# Patient Record
Sex: MALE | Race: WHITE
[De-identification: names, ages, dates, MRNs, and addresses within clinical notes are randomized per-mention and may not be internally consistent; named-entity substitution may affect disease eponyms.]

---

## 2021-01-11 ENCOUNTER — HOSPITAL ENCOUNTER (OUTPATIENT)
Dept: HOSPITAL 46 - DS | Age: 41
Discharge: HOME | End: 2021-01-11
Attending: SPECIALIST
Payer: COMMERCIAL

## 2021-01-11 VITALS — BODY MASS INDEX: 35.29 KG/M2 | HEIGHT: 72 IN | WEIGHT: 260.54 LBS

## 2021-01-11 DIAGNOSIS — G47.33: ICD-10-CM

## 2021-01-11 DIAGNOSIS — I10: ICD-10-CM

## 2021-01-11 DIAGNOSIS — G89.18: ICD-10-CM

## 2021-01-11 DIAGNOSIS — M19.111: Primary | ICD-10-CM

## 2021-01-11 DIAGNOSIS — Z79.01: ICD-10-CM

## 2021-01-11 PROCEDURE — C1776 JOINT DEVICE (IMPLANTABLE): HCPCS

## 2021-01-11 PROCEDURE — L3670 SO ACRO/CLAV CAN WEB PRE OTS: HCPCS

## 2021-01-11 NOTE — NUR
[id number]: IN TO PT ROOM, PT DENIES NAUSEA AT THIS TIME. WILL HOLD NEW ORDER IN
CASE PT BECOMES NAUSEATED AGAIN. PT CONT TO WORK WITH PHYSICAL THERAPY.
1530: ILENE WITH PHYSICAL THERAPY STATES PT IS SAFE TO DC HOME. PT UP TO
BATHROOM WITH RN ASSIST, STEADY GAIT DENIES NAUSEA OR DIZZINESS. PT ABLE TO
VOID QS ORANGE COLORED URINE WITH NO PROBLEMS. BACK TO ROOM TO REST.
1600: DR. NIEVES NOTIFIED OF PT STATUS, GIVES VERBAL DC ORDER.
1630: PT MOTHER ARRIVES TO DS RM 4. DC INSTRUCTIONS PRESENTED TO BOTH PT AND
MOTHER, MANY QUESTIONS ASKED AND ADDRESSED. PT AWARE OF MEDICATION ESCRIPTED
TO PREFERRED PHARMACY. THIS RN ASSISTS DRESSING PT, EDUCATION GIVEN REGARDING
SLING AND CRYO CUFF. MOTHER PULLS PERSONAL VEHICLE IN FRONT OF MAIN HOSPITAL
DOORS. PT DC FROM DS RM 4 VIA WC TO HOME.

## 2021-01-11 NOTE — NUR
PATIENT CALLED REPORTING NAUSEA WHILE WORKING WITH PHYSICAL THERAPY. PER MAR
NO AVAILABLE ORDERS TO ADMINISTER ANTIEMETICS. NOTIFIED NIEVES BURRIS, WHO
VERBALIZED INAPSINE 1.25 MG IV ONCE. REPORTED TO DANIEL GUO, PRIMARY NURSE.

## 2021-01-11 NOTE — NUR
PT RESTING WITH EYES CLOSED ON ENTRANCE TO ROOM. PT EASILY AROUSES WITH VERBAL
STIMULI. PT DENIES ANY PAIN IN RIGHT SHOULDER. PT TOLERATES WATER AND JELLO
WITH NO NAUSEA. CONT PULSE OXIMETER IN PLACE, SATS GREATER THAN 94% ON RA.
CALL LIGHT WITHIN REACH.

## 2021-01-11 NOTE — NUR
PT ARRIVES TO PACU AWAKE AND ALERT, RR EVEN AND UNLABORED. PT DENIES PAIN IN
RIGHT SHOULDER STATES "COMPLETELY NUMB" AND IS UNABLE TO MOVE DIGITS ON RIGHT
SIDE. PT PROVIDED ICED WATER, JELLO AND CRACKERS. PT REQUESTS TO LAY BACK AND
LIGHTS DIMMED. CALL LIGHT WITHIN REACH.

## 2021-01-11 NOTE — NUR
PT WAKES WHEN THIS RN ENTERS ROOM. PT POSITIONED TO 90 DEGREES IN BED. PT
DENIES ANY PAIN OR NAUSEA. NERVE BLOCK IN PLACE, PT UNABLE TO OPEN HAND OR
MOVE THUMB AT THIS TIME. PT STATES RIGHT ARM NUMB. ILENE WITH PT IN PT ROOM
WITH STUDENT. PT REQUESTS "FRUIT [facility]MOOTHIE" THIS RN WILL NOTIFY DIETARY. CALL
LIGHT WITHIN REACH.

## 2021-01-11 NOTE — OR
[facility]
                                    [address], [place]  [address]
_________________________________________________________________________________________
                                                                 Signed   
 
 
DATE OF OPERATION:
01/11/2021
 
SURGEON:
Ran Araujo MD
 
PREOPERATIVE DIAGNOSIS:
Posttraumatic degenerative joint disease, right shoulder.
 
POSTOPERATIVE DIAGNOSIS:
Posttraumatic degenerative joint disease, right shoulder.
 
PROCEDURE PERFORMED:
Right shoulder hemiarthroplasty.
 
ASSISTANT:
Tisha Frederick PA-C.
 
ANESTHESIA:
General.
 
BLOOD LOSS:
200 mL.
 
IMPLANTS:
[place] size 12 reunion with a 52 x 17 head.
 
BRIEF HISTORY:
Lex is a 40-year-old gentleman who had increasing shoulder pain and stiffness.  He
had multiple dislocations as a youth and was fixed about 15 years ago.  The shoulder
continued to progress __________ fashion.  Risks and benefits of operative treatment
were discussed with him and he elected to proceed. 
 
DESCRIPTION OF PROCEDURE:
Once consent was obtained, he was taken to the operating room.  After adequate
anesthesia, he was placed in a low beach chair position.  All downside pressure points
were well padded.  The opposite arm was placed in well-padded arm conner.  The arm was
then prepped and draped in a standard sterile fashion from the angle of the jaw to the
hand.  The standard deltopectoral approach was taken through skin and subcutaneous
tissue.  The cephalic vein was located and was dissected free and taken medially.  All
bleeders were cauterized as we went.  The deltoid was then mobilized with the arm in
abduction.  The conjoined tendon was located and mobilized to protect the nerve.  The
 
    Electronically Signed By: RAN ARAUJO MD  01/11/21 1502
_________________________________________________________________________________________
PATIENT NAME:     LEX AGUILAR                 
MEDICAL RECORD #: [id number]            OPERATIVE REPORT              
          ACCT #: [id number]  
DATE OF BIRTH:   [birth year]            REPORT #: [id number]      
PHYSICIAN:        RAN ARAUJO MD              
PCP:              MARCELINO ZAMAN              
REPORT IS CONFIDENTIAL AND NOT TO BE RELEASED WITHOUT AUTHORIZATION
 
 
                                  [facility]
                                    [address], [place]  [address]
_________________________________________________________________________________________
                                                                 Signed   
 
 
inferior vessels were then cauterized and the subscapularis was identified.  The biceps
tendon was identified and the biceps tendon sheath was opened up.  The biceps was then
tenodesed at the pectoralis insertion.  The upper 1/3rd of the pectoralis was released
to help with exposure.  The biceps was then tenotomized and the biceps tendon groove was
opened all the way into the rotator cuff interval.  The lesser tuberosity was then
osteotomized using curved osteotome and the capsule was peeled off the anterior humerus.
 This mobilized the subscapularis.  Adhesions deep and superficial surfaces were bluntly
dissected.  There was good excursion of the tendon, #2 FiberWire was placed in this to
provide retraction.  Once this was opened up, the labrum and remainder of the biceps was
removed.  The shoulder was then dislocated.  The center of the shoulder of the humeral
shaft was then opened up with a rongeur superiorly.  The sharp awl was then used to
enter the canal and it was sequentially reamed up to a 13.  The cutting jig was then
placed on the reamer and advanced until it was in the proper position such that the cut
would come out right at the insertion of the rotator cuff.  The cut was made with care
taken to protect the surrounding soft tissues.  The cut was finished using the
osteotomes and the piece was passed off to be measured.  The periarticular osteophytes
were removed and the humerus was broached starting with a 9 going up to a 12.  We left
the 12 in position and trialed a 52 x 20 head, which was too big.  There was not enough
posterior excursion.  We went down to the next level which was a 52 x 17, and we had
good 50% posterior subluxation and good range of motion in abduction and internal
rotation.  The shoulder was again dislocated and the trials were removed.  Three drill
holes were placed in the lesser tuberosity and #5 Ethibond was placed through these.
The stem was then impacted until it was almost completely seated and then, the head was
placed on it and the final __________ well seated.  The shoulder was again reduced taken
through range of motion as noted above.  All bleeders were cauterized throughout the
procedure.  The shoulder was then irrigated using normal saline followed by dilute
iodine, followed by more normal saline.  The periarticular soft tissues were injected
with 60 mL of Toradol-ropivacaine mixture.  The lesser tuberosity was then repaired back
to its bony bed using #5 FiberWire previously placed using a racking hitch stitch.  The
three stitches were then tied to each other.  The rotator cuff interval was partially
closed using #2 FiberWire and both repairs were over sewed with #2 FiberWire.  There was
excellent adherence at the end.  The wound was again irrigated and deltopectoral
interval was closed using 0 Vicryl.  The vein was noted to be torn at the end of the
procedure and was tied off with 3-0 silk.  The subcutaneous tissue was closed with 2-0
Stratafix, and the skin with staples.  The wound was dressed with a ANNE-MARIE wound VAC
dressing.  He was awakened and taken to the recovery room in satisfactory condition.
All sponge, needle, and instrument counts were correct. 
 
 
 
            ________________________________________
            Ran Araujo MD 
 
    Electronically Signed By: RAN ARAUJO MD  01/11/21 1502
_________________________________________________________________________________________
PATIENT NAME:     LEX AGUILAR                 
MEDICAL RECORD #: [id number]            OPERATIVE REPORT              
          ACCT #: [id number]  
DATE OF BIRTH:   [birth year]            REPORT #: [id number]      
PHYSICIAN:        RAN ARAUJO MD              
PCP:              MARCELINO ZAMAN              
REPORT IS CONFIDENTIAL AND NOT TO BE RELEASED WITHOUT AUTHORIZATION
 
 
                                  [facility]
                                    [address], [place]  [address]
_________________________________________________________________________________________
                                                                 Signed   
 
 
 
 
/[facility]
Job #:  [id number]
DD:  01/11/2021 11:44:48
DT:  01/11/2021 12:08:13
 
 
Copies:                                
~
 
 
 
 
 
 
 
 
 
 
 
 
 
 
 
 
 
 
 
 
 
 
 
 
 
 
 
 
 
 
 
 
 
    Electronically Signed By: RAN ARAUJO MD  01/11/21 1502
_________________________________________________________________________________________
PATIENT NAME:     LEX AGIULAR                 
MEDICAL RECORD #: [id number]            OPERATIVE REPORT              
          ACCT #: [id number]  
DATE OF BIRTH:   [birth year]            REPORT #: [id number]      
PHYSICIAN:        RAN ARAUJO MD              
PCP:              MARCELINO ZAMAN              
REPORT IS CONFIDENTIAL AND NOT TO BE RELEASED WITHOUT AUTHORIZATION

## 2021-01-11 NOTE — NUR
BILATERAL ADOLFO HOSE IN PLACE WITH FOOTPUMPS. PT BRUSHED TEETH WITH PROVIDED
RINSE AND NOSE SWABBED. MOTHER IN ROOM AT BEDSIDE, CALL LIGHT WITHIN REACH.

## 2021-01-11 NOTE — NUR
01/11/21 1159 Sheets,Darcie
1145 PT ARRIVED TO PACU SNORING AND JAW THRUST NEEDED OFF AND ON TO
MAINTAIN AIRWAY. PT ON 6L VIA MASK, VSS.
 
1152 PT MAINTAIING OWN AIRWAY AND SNORING NOTED. PT REACTIVE TO
PAINFUL STIMULI.
 
1156 PT WAKES AND IS REORIENTED TO PACU, PT DENIES PAIN AND NAUSEA.
 
1157 O2 MASK REMOVED AND PT ENCOURAGED TO DEEP BREATH.

## 2022-10-14 ENCOUNTER — HOSPITAL ENCOUNTER (EMERGENCY)
Dept: HOSPITAL 46 - ED | Age: 42
Discharge: HOME | End: 2022-10-14
Payer: COMMERCIAL

## 2022-10-14 VITALS — BODY MASS INDEX: 35.21 KG/M2 | HEIGHT: 72 IN | WEIGHT: 259.99 LBS

## 2022-10-14 DIAGNOSIS — Z79.899: ICD-10-CM
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
## 2022-10-14 DIAGNOSIS — I10: ICD-10-CM

## 2022-10-14 DIAGNOSIS — Z23: ICD-10-CM

## 2022-10-14 DIAGNOSIS — S80.211A: ICD-10-CM

## 2022-10-14 DIAGNOSIS — S71.112A: Primary | ICD-10-CM

## 2022-10-14 PROCEDURE — A9270 NON-COVERED ITEM OR SERVICE: HCPCS

## 2022-10-14 PROCEDURE — 0HQJXZZ REPAIR LEFT UPPER LEG SKIN, EXTERNAL APPROACH: ICD-10-PCS | Performed by: FAMILY MEDICINE

## 2022-10-14 NOTE — XMS
PreManage Notification: YULI AGUILAR MRN:[id number]
 
Security Information
 
Security Events
No recent Security Events currently on file
 
 
 
[facility]
------------
[facility]
 
 
CARE PROVIDERS
There are no care providers on record at this time.
 
[facility] has no Care Guidelines for this patient.
 
[facility] VISIT COUNT (12 MO.)
-------------------------------------------------------------------------------------
1 DOMITILA Arroyo
-------------------------------------------------------------------------------------
TOTAL 1
-------------------------------------------------------------------------------------
NOTE: Visits indicate total known visits.
 
ED/UCC VISIT TRACKING (12 MO.)
-------------------------------------------------------------------------------------
10/14/2022 20:10
DOMITILA [place]
 
TYPE: Emergency
 
COMPLAINT:
- R AND L LEG INJ
-------------------------------------------------------------------------------------
 
 
INPATIENT VISIT TRACKING (12 MO.)
No inpatient visits to display in this time frame
 
[email or web address]

## 2025-02-27 ENCOUNTER — HOSPITAL ENCOUNTER (OUTPATIENT)
Dept: HOSPITAL 46 - DS | Age: 45
Discharge: HOME | End: 2025-02-27
Attending: SURGERY
Payer: COMMERCIAL

## 2025-02-27 VITALS — DIASTOLIC BLOOD PRESSURE: 88 MMHG | SYSTOLIC BLOOD PRESSURE: 132 MMHG

## 2025-02-27 VITALS — WEIGHT: 255.52 LBS | BODY MASS INDEX: 34.61 KG/M2 | HEIGHT: 72 IN

## 2025-02-27 VITALS — DIASTOLIC BLOOD PRESSURE: 94 MMHG | SYSTOLIC BLOOD PRESSURE: 146 MMHG

## 2025-02-27 DIAGNOSIS — I10: ICD-10-CM

## 2025-02-27 DIAGNOSIS — G47.30: ICD-10-CM

## 2025-02-27 DIAGNOSIS — Z12.11: Primary | ICD-10-CM

## 2025-02-27 DIAGNOSIS — Z80.0: ICD-10-CM

## 2025-02-27 DIAGNOSIS — Z79.899: ICD-10-CM

## 2025-02-27 DIAGNOSIS — E66.9: ICD-10-CM

## 2025-02-27 PROCEDURE — G0500 MOD SEDAT ENDO SERVICE >5YRS: HCPCS

## 2025-02-27 PROCEDURE — 0DJD8ZZ INSPECTION OF LOWER INTESTINAL TRACT, VIA NATURAL OR ARTIFICIAL OPENING ENDOSCOPIC: ICD-10-PCS | Performed by: SURGERY

## 2025-02-27 NOTE — NUR
02/27/25 0820 Lisa Zacarias
0805- PT PRESENTS TO PACU, SEMI NEFF POSITION. AWAKE BUT VERY
DROWSY. REPORTS PAIN 1/10 TO ABD, FIRM, ENCOURAGED TO PASS GAS. DENIES
NAUSEA. LR INFUSING TO RW IV. O2 AT 2L PER NC ON ARRIVAL, TURNED TO
ROOM AIR. BREATHING EVEN AND NON LABORED. ALL MONITORS IN PLACE.
 
0815- PT RESTING INTERMITTENTLY, CONTINUE TO MONITOR, NO SIGNS OF
DISTRESS.
 
0820- PT REPORTS NO CHANGE IN ABD PAIN,  EDUCATED ON LAYING ON SIDE TO
HELP PASS GAS. PT CHOOSES TO LAY ON BACK, AWAKE OFF AND ON.

## 2025-03-01 NOTE — OR
[facility]
                                    [address], [place]  [address]
_________________________________________________________________________________________
                                                                 Signed   
 
 
DATE OF OPERATION:
02/27/2025
 
SURGEON:
Junior Beltran MD
 
PREOPERATIVE DIAGNOSIS:
Family history of colon cancer (mother).
 
POSTOPERATIVE DIAGNOSIS:
Normal colon.
 
PROCEDURE:
Total colonoscopy to cecum.
 
ANESTHESIA:
Intravenous sedation; fentanyl 150 mcg and Versed 6 mg.
 
INDICATION:
This 44-year-old white man is a patient of BRANDY Martinez.  He is known to have a
family history of colon cancer in his mother on whom I operated.  The patient currently
has no symptoms of bleeding, diarrhea, or constipation.  He is admitted at this time to
undergo screening colonoscopy on the basis of his family history and his age of 44
years.  He understands the risk of bleeding, infection, and perforation and wished to
proceed. 
 
FINDINGS:
The prep was excellent.  Complete colonoscopy was undertaken to the cecum with full
intubation of the cecum.  There was no sign of polyp, diverticular formation, colitis,
or cancer.  Retroflexed view was normal as well. 
 
DESCRIPTION OF PROCEDURE:
The patient was brought to the endoscopy suite and placed in the lateral decubitus
position, given intravenous sedation to the point of slurred speech and nystagmus.
Digital rectal examination was normal. 
 
An Olympus video colonoscope was passed in the rectum and manipulated throughout the
colon ultimately intubating the cecum itself.  The ileocecal valve and appendiceal
orifice were normal.  Scope was withdrawn from that point and examination throughout
showed no sign of abnormality, specifically no polyps, diverticular formation, colitis
or cancer.  Retroflexed view of the rectum was normal.  Scope was removed and the
patient was taken to the recovery room in good condition. 
 
    Electronically Signed By: JUNIOR BELTRAN MD  03/01/25 1212
_________________________________________________________________________________________
PATIENT NAME:     YULI AGUILAR                 
MEDICAL RECORD #: [id number]            OPERATIVE REPORT              
          ACCT #: [id number]  
DATE OF BIRTH:   [birth year]            REPORT #: [id number]      
PHYSICIAN:        JUNIOR BELTRAN MD                 
PCP:              MARCELINO ZAMAN              
REPORT IS CONFIDENTIAL AND NOT TO BE RELEASED WITHOUT AUTHORIZATION
 
 
                                  [facility]
                                    [address] [place], [place] [address], [place]  [address]
_________________________________________________________________________________________
                                                                 Signed   
 
 
 
CONCLUDING DIAGNOSIS:
Normal colon to cecum.
 
PLAN:
Recommend repeat colonoscopy in 5 years due to family history, sooner if symptoms should
develop.  He will return to the ongoing care of BRANDY Martinez. 
 
 
 
            ________________________________________
            MD KIMBERLY Collins/PERRYL
Job #:  [id number]
DD:  02/27/2025 08:09:20
DT:  02/27/2025 08:34:32
 
cc:            BARNDY Martinez
 
 
Copies:  MARCELINO ZAMAN
~
 
 
 
 
 
 
 
 
 
 
 
 
 
 
 
 
 
 
 
    Electronically Signed By: JUNIOR BELTRAN MD  03/01/25 1212
_________________________________________________________________________________________
PATIENT NAME:     YULI AGUILAR                 
MEDICAL RECORD #: [id number]            OPERATIVE REPORT              
          ACCT #: [id number]  
DATE OF BIRTH:   [birth year]            REPORT #: [id number]      
PHYSICIAN:        JUNIOR BELTRAN MD                 
PCP:              MARCELINO ZAMAN              
REPORT IS CONFIDENTIAL AND NOT TO BE RELEASED WITHOUT AUTHORIZATION